# Patient Record
Sex: FEMALE | Race: WHITE | NOT HISPANIC OR LATINO | URBAN - NONMETROPOLITAN AREA
[De-identification: names, ages, dates, MRNs, and addresses within clinical notes are randomized per-mention and may not be internally consistent; named-entity substitution may affect disease eponyms.]

---

## 2023-09-25 ENCOUNTER — OFFICE (OUTPATIENT)
Dept: URBAN - NONMETROPOLITAN AREA CLINIC 1 | Facility: CLINIC | Age: 18
End: 2023-09-25
Payer: COMMERCIAL

## 2023-09-25 VITALS
HEART RATE: 104 BPM | HEIGHT: 65 IN | WEIGHT: 131 LBS | SYSTOLIC BLOOD PRESSURE: 100 MMHG | DIASTOLIC BLOOD PRESSURE: 79 MMHG | BODY MASS INDEX: 21.83 KG/M2

## 2023-09-25 DIAGNOSIS — E03.9 HYPOTHYROIDISM, UNSPECIFIED: ICD-10-CM

## 2023-09-25 DIAGNOSIS — F31.9 BIPOLAR DISORDER, UNSPECIFIED: ICD-10-CM

## 2023-09-25 DIAGNOSIS — K51.90 ULCERATIVE COLITIS, UNSPECIFIED, WITHOUT COMPLICATIONS: ICD-10-CM

## 2023-09-25 DIAGNOSIS — F84.5 ASPERGER'S SYNDROME: ICD-10-CM

## 2023-09-25 DIAGNOSIS — F90.9 ATTENTION-DEFICIT HYPERACTIVITY DISORDER, UNSPECIFIED TYPE: ICD-10-CM

## 2023-09-25 DIAGNOSIS — K21.9 GASTRO-ESOPHAGEAL REFLUX DISEASE WITHOUT ESOPHAGITIS: ICD-10-CM

## 2023-09-25 DIAGNOSIS — F84.0 AUTISTIC DISORDER: ICD-10-CM

## 2023-09-25 PROCEDURE — 99203 OFFICE O/P NEW LOW 30 MIN: CPT | Performed by: NURSE PRACTITIONER

## 2023-09-25 RX ORDER — SULFASALAZINE 500 MG/1
2000 TABLET ORAL
Qty: 120 | Refills: 5 | Status: ACTIVE
Start: 2023-09-25

## 2023-09-25 NOTE — SERVICENOTES
If she hasnt had labs within the past month, she'll need to come by one day next week for CBC, CMP, sed, CRP, vit D, iron/ tibc/ ferr.

## 2023-09-25 NOTE — SERVICEHPINOTES
Brought in by her father to establish care.  She has a history of UC, diagnosed 2016.  She was previously managed at [facility]. There was some confusion regarding the dosing of her meds, but according to her father, she is taking sulfasalazine 500 mg, 2 tabs bid.  Needs prescription refills.   She denies any GI concerns and her father says she doesn't complain of anything.  No nausea or abdominal pain.  No constipation or diarrhea. No blood with her stools.  She says she recently had labs done so we'll request her records. Her chronic illnesses include autism, ADHD, bipolar disorder, Asperger's syndrome, hypothyroid, chronic constipation, ulcerative colitis.br
br
br

## 2024-03-25 ENCOUNTER — OFFICE (OUTPATIENT)
Dept: URBAN - NONMETROPOLITAN AREA CLINIC 1 | Facility: CLINIC | Age: 19
End: 2024-03-25
Payer: COMMERCIAL

## 2024-03-25 VITALS
SYSTOLIC BLOOD PRESSURE: 104 MMHG | DIASTOLIC BLOOD PRESSURE: 75 MMHG | WEIGHT: 133 LBS | HEART RATE: 108 BPM | HEIGHT: 65 IN

## 2024-03-25 DIAGNOSIS — K21.9 GASTRO-ESOPHAGEAL REFLUX DISEASE WITHOUT ESOPHAGITIS: ICD-10-CM

## 2024-03-25 DIAGNOSIS — K51.90 ULCERATIVE COLITIS, UNSPECIFIED, WITHOUT COMPLICATIONS: ICD-10-CM

## 2024-03-25 DIAGNOSIS — F84.5 ASPERGER'S SYNDROME: ICD-10-CM

## 2024-03-25 DIAGNOSIS — F90.9 ATTENTION-DEFICIT HYPERACTIVITY DISORDER, UNSPECIFIED TYPE: ICD-10-CM

## 2024-03-25 DIAGNOSIS — E03.9 HYPOTHYROIDISM, UNSPECIFIED: ICD-10-CM

## 2024-03-25 DIAGNOSIS — F84.0 AUTISTIC DISORDER: ICD-10-CM

## 2024-03-25 DIAGNOSIS — F31.9 BIPOLAR DISORDER, UNSPECIFIED: ICD-10-CM

## 2024-03-25 PROCEDURE — 36415 COLL VENOUS BLD VENIPUNCTURE: CPT | Performed by: NURSE PRACTITIONER

## 2024-03-25 PROCEDURE — 99214 OFFICE O/P EST MOD 30 MIN: CPT | Performed by: NURSE PRACTITIONER

## 2024-03-25 RX ORDER — SULFASALAZINE 500 MG/1
2000 TABLET ORAL
Qty: 120 | Refills: 5 | Status: ACTIVE
Start: 2023-09-25

## 2024-03-25 NOTE — SERVICEHPINOTES
Brought in by her father for follow-up of ulcerative colitis.  Diagnosed 2016.  She is doing well on Azulfidine 500 mg, 2 tabs b.i.d.  She needs refills.  She tends to be constipated, but does not require laxatives.    She is taking famotidine 40 mg b.i.d. for chronic GERD, doing well in that regard.    No nausea or abdominal pain.  No blood with her stools.    Her chronic illnesses include autism, ADHD, bipolar disorder, Asperger syndrome, hypothyroid, chronic constipation.

## 2024-03-25 NOTE — SERVICENOTES
I have asked them to follow up in 6 months, with one of our gastroenterologist.  I will be happy to see her again after that.
I will contact them with her lab results.

## 2025-04-23 ENCOUNTER — OFFICE (OUTPATIENT)
Dept: URBAN - NONMETROPOLITAN AREA CLINIC 1 | Facility: CLINIC | Age: 20
End: 2025-04-23

## 2025-04-23 VITALS
HEIGHT: 65 IN | WEIGHT: 140 LBS | DIASTOLIC BLOOD PRESSURE: 75 MMHG | SYSTOLIC BLOOD PRESSURE: 95 MMHG | HEART RATE: 104 BPM

## 2025-04-23 DIAGNOSIS — K21.9 GASTRO-ESOPHAGEAL REFLUX DISEASE WITHOUT ESOPHAGITIS: ICD-10-CM

## 2025-04-23 DIAGNOSIS — E03.9 HYPOTHYROIDISM, UNSPECIFIED: ICD-10-CM

## 2025-04-23 DIAGNOSIS — K51.90 ULCERATIVE COLITIS, UNSPECIFIED, WITHOUT COMPLICATIONS: ICD-10-CM

## 2025-04-23 DIAGNOSIS — F90.9 ATTENTION-DEFICIT HYPERACTIVITY DISORDER, UNSPECIFIED TYPE: ICD-10-CM

## 2025-04-23 DIAGNOSIS — F84.0 AUTISTIC DISORDER: ICD-10-CM

## 2025-04-23 DIAGNOSIS — F31.9 BIPOLAR DISORDER, UNSPECIFIED: ICD-10-CM

## 2025-04-23 DIAGNOSIS — F84.5 ASPERGER'S SYNDROME: ICD-10-CM

## 2025-04-23 PROCEDURE — 99214 OFFICE O/P EST MOD 30 MIN: CPT | Performed by: NURSE PRACTITIONER

## 2025-04-23 RX ORDER — SULFASALAZINE 500 MG/1
2000 TABLET ORAL
Qty: 120 | Refills: 11 | Status: ACTIVE
Start: 2023-09-25